# Patient Record
(demographics unavailable — no encounter records)

---

## 2024-12-09 NOTE — HISTORY OF PRESENT ILLNESS
[FreeTextEntry1] : new patient CPE  [de-identified] : 33-year-old female with past medical history asthma, anxiety, GERD, dysmenorrhea, irritable bowel syndrome, right patellofemoral syndrome, transaminitis. Patient has seasonal allergies and allergies to zosyn. Patient does not take any medications on a daily basis.  LMP Nov 13

## 2024-12-09 NOTE — REVIEW OF SYSTEMS
[Fatigue] : fatigue [Vision Problems] : vision problems [Shortness Of Breath] : shortness of breath [Dyspnea on Exertion] : dyspnea on exertion [Abdominal Pain] : abdominal pain [Constipation] : constipation [Heartburn] : heartburn [Dysuria] : dysuria [Headache] : headache [Insomnia] : insomnia [Anxiety] : anxiety [Fever] : no fever [Chills] : no chills [Earache] : no earache [Hearing Loss] : no hearing loss [Hoarseness] : no hoarseness [Nausea] : no nausea [Vomiting] : no vomiting [Hematuria] : no hematuria [Frequency] : no frequency [Dizziness] : no dizziness [Suicidal] : not suicidal [FreeTextEntry2] : increased sweating in hands, feet, and underarms  [FreeTextEntry5] : occasional right sided chest pressure for the past month  [FreeTextEntry9] : bilateral arm pain and numbness at night; right hip pain

## 2024-12-09 NOTE — PHYSICAL EXAM
[No CVA Tenderness] : no CVA  tenderness [Normal] : affect was normal and insight and judgment were intact [de-identified] : right sided lower quadrant abdominal pain  [de-identified] : tenderness in right hip

## 2024-12-09 NOTE — HEALTH RISK ASSESSMENT
[Good] : ~his/her~  mood as  good [No] : No [No falls in past year] : Patient reported no falls in the past year [Patient reported PAP Smear was normal] : Patient reported PAP Smear was normal [HIV Test offered] : HIV Test offered [Hepatitis C test offered] : Hepatitis C test offered [With Family] : lives with family [Unemployed] : unemployed [Less Than High School] : less than high school [] :  [# Of Children ___] : has [unfilled] children [Sexually Active] : sexually active [Feels Safe at Home] : Feels safe at home [Fully functional (bathing, dressing, toileting, transferring, walking, feeding)] : Fully functional (bathing, dressing, toileting, transferring, walking, feeding) [Reports changes in vision] : Reports changes in vision [Never] : Never [FreeTextEntry1] : right hip pain and bilateral arm arthralgias  [de-identified] : house cleaning [de-identified] : so-so; rice and meat, chicken, fish  [High Risk Behavior] : no high risk behavior [PapSmearDate] : 1/24 [FreeTextEntry2] : house cleaning

## 2025-01-09 NOTE — HISTORY OF PRESENT ILLNESS
[FreeTextEntry1] : follow up blood work  [de-identified] : 33-year-old female with  past medical history asthma, anxiety, GERD, dysmenorrhea, irritable bowel syndrome, right patellofemoral syndrome, transaminitis. Patient is here for follow up of blood work. Patient also is endorsing that her her bilateral legs feel heavy overnight, but improves in the morning. Patient also endorses that her fingers feel cold during the day. Patient states she feels tired and feels that she cannot sleep at night. Patient reports she is taking the Vitamin D supplement once a week. Patient states that her burning with urination got better with a week of antibiotics, but then after completing the antibiotics she states she still has burning with urination. Patient denies hematuria.     LMP December 15

## 2025-01-09 NOTE — COUNSELING
[Potential consequences of obesity discussed] : Potential consequences of obesity discussed [Benefits of weight loss discussed] : Benefits of weight loss discussed [Structured Weight Management Program suggested:] : Structured weight management program suggested [Target Wt Loss Goal ___] : Weight Loss Goals: Target weight loss goal [unfilled] lbs [Good understanding] : Patient has a good understanding of disease, goals and obesity follow-up plan [FreeTextEntry2] : Dietary changes, breakfast, snack, lunch, snack, dinner, snack recommendations Food recommendations offered Bread recommendations Increased fruits and vegetables Decrease sugar and chocolate Goal weight loss of 6% in 3 months

## 2025-01-09 NOTE — REVIEW OF SYSTEMS
[Fatigue] : fatigue [Vision Problems] : vision problems [Dysuria] : dysuria [Negative] : Gastrointestinal [Hematuria] : no hematuria [FreeTextEntry6] : feels that she stops breathing for a couple seconds  [FreeTextEntry9] : knee and leg pain bilatearlly; pain in right wrist with cyst; pain in fingers and hands

## 2025-01-09 NOTE — PHYSICAL EXAM
[Normal] : affect was normal and insight and judgment were intact [de-identified] : knee click on right/left; swelling of b/l knees  [de-identified] : 5/5 strength MACKENZIE MO

## 2025-04-10 NOTE — INTERPRETER SERVICES
[Patient Declined  Services] : - None: Patient declined  services [FreeTextEntry3] :  Shared Language Moldovan with MD

## 2025-04-10 NOTE — PHYSICAL EXAM
[Normal] : normal rate, regular rhythm, normal S1 and S2 and no murmur heard [No Edema] : there was no peripheral edema [Soft] : abdomen soft [Non Tender] : non-tender [Non-distended] : non-distended [No Masses] : no abdominal mass palpated [No CVA Tenderness] : no CVA  tenderness [No Spinal Tenderness] : no spinal tenderness [de-identified] : NO back deformities, (-) straight leg test [de-identified] : dermatitis of lower legs

## 2025-04-10 NOTE — PHYSICAL EXAM
[Normal] : normal rate, regular rhythm, normal S1 and S2 and no murmur heard [No Edema] : there was no peripheral edema [Soft] : abdomen soft [Non Tender] : non-tender [Non-distended] : non-distended [No Masses] : no abdominal mass palpated [No CVA Tenderness] : no CVA  tenderness [No Spinal Tenderness] : no spinal tenderness [de-identified] : dermatitis of lower legs [de-identified] : NO back deformities, (-) straight leg test

## 2025-04-10 NOTE — HISTORY OF PRESENT ILLNESS
[de-identified] : 34-year-old female with past medical history asthma, anxiety, GERD, dysmenorrhea, irritable bowel syndrome, right patellofemoral syndrome, transaminitis here for follow up of hand pain and vitamin D deficiency -stabbing pain in her back for many years has not worsened over the years. on and off. worse with activity. NO associated hematuria, incontinence, retention of stool or urine. no injury -had rectal mass in the past - no longer has  -still has hemorrhoids and are sometimes itchy and a little painful, but tolerable -notes itching on lower legs that turned into a rash and is bothering her

## 2025-04-10 NOTE — HISTORY OF PRESENT ILLNESS
[de-identified] : 34-year-old female with past medical history asthma, anxiety, GERD, dysmenorrhea, irritable bowel syndrome, right patellofemoral syndrome, transaminitis here for follow up of hand pain and vitamin D deficiency -stabbing pain in her back for many years has not worsened over the years. on and off. worse with activity. NO associated hematuria, incontinence, retention of stool or urine. no injury -had rectal mass in the past - no longer has  -still has hemorrhoids and are sometimes itchy and a little painful, but tolerable -notes itching on lower legs that turned into a rash and is bothering her

## 2025-04-10 NOTE — PHYSICAL EXAM
[Normal] : normal rate, regular rhythm, normal S1 and S2 and no murmur heard [No Edema] : there was no peripheral edema [Soft] : abdomen soft [Non Tender] : non-tender [Non-distended] : non-distended [No Masses] : no abdominal mass palpated [No CVA Tenderness] : no CVA  tenderness [No Spinal Tenderness] : no spinal tenderness [de-identified] : dermatitis of lower legs [de-identified] : NO back deformities, (-) straight leg test

## 2025-04-10 NOTE — PHYSICAL EXAM
[Normal] : normal rate, regular rhythm, normal S1 and S2 and no murmur heard [No Edema] : there was no peripheral edema [Soft] : abdomen soft [Non Tender] : non-tender [Non-distended] : non-distended [No Masses] : no abdominal mass palpated [No CVA Tenderness] : no CVA  tenderness [No Spinal Tenderness] : no spinal tenderness [de-identified] : NO back deformities, (-) straight leg test [de-identified] : dermatitis of lower legs

## 2025-04-10 NOTE — INTERPRETER SERVICES
[Patient Declined  Services] : - None: Patient declined  services [FreeTextEntry3] :  Shared Language Irish with MD

## 2025-04-10 NOTE — HISTORY OF PRESENT ILLNESS
[de-identified] : 34-year-old female with past medical history asthma, anxiety, GERD, dysmenorrhea, irritable bowel syndrome, right patellofemoral syndrome, transaminitis here for follow up of hand pain and vitamin D deficiency -stabbing pain in her back for many years has not worsened over the years. on and off. worse with activity. NO associated hematuria, incontinence, retention of stool or urine. no injury -had rectal mass in the past - no longer has  -still has hemorrhoids and are sometimes itchy and a little painful, but tolerable -notes itching on lower legs that turned into a rash and is bothering her

## 2025-04-10 NOTE — HISTORY OF PRESENT ILLNESS
[de-identified] : 34-year-old female with past medical history asthma, anxiety, GERD, dysmenorrhea, irritable bowel syndrome, right patellofemoral syndrome, transaminitis here for follow up of hand pain and vitamin D deficiency -stabbing pain in her back for many years has not worsened over the years. on and off. worse with activity. NO associated hematuria, incontinence, retention of stool or urine. no injury -had rectal mass in the past - no longer has  -still has hemorrhoids and are sometimes itchy and a little painful, but tolerable -notes itching on lower legs that turned into a rash and is bothering her

## 2025-04-17 NOTE — HISTORY OF PRESENT ILLNESS
[de-identified] : 34-year-old female with past medical history of asthma, anxiety, GERD, dysmenorrhea, irritable bowel syndrome, right patellofemoral syndrome, transaminitis. Patient continues to have pain in bilateral legs at night. Patient notes a heaviness in her legs. Patient also notes hand pain during the night. Patient does not take anything for the pain. Patient states the pain comes on around midnight every night.   Patient has not been taking her vitamin D because she ran out.   Patient endorses pain in the left side of her lower back. Patient notes that she has been feeling this pain for two to three months. Patient notes that the pain comes and goes. Patient tried the lidocaine patch and does not feel that it helps. Patient has not started physical therapy yet as she has not had time to schedule the appointment.

## 2025-04-17 NOTE — PHYSICAL EXAM
[facility] ambulatory encounter  FAMILY PRACTICE OFFICE VISIT    CHIEF COMPLAINT:    Chief Complaint   Patient presents with   • Follow-up   • Office Visit       SUBJECTIVE:  Kenyetta Cronin is a 78 year old female who presented requesting evaluation for ***    Review of systems:   {ROS:[id number]}     OBJECTIVE:  PROBLEM LIST:   Patient Active Problem List   Diagnosis   • Osteopenia   • Urinary incontinence   • Osteoarthritis   • Left ovarian mass   • Hypercalcemia   • Thickened endometrium       PAST HISTORIES:   {History Links:[id number]::\"I have reviewed the past medical history, family history, social history, medications and allergies listed in the medical record as obtained by my nursing staff and support staff and agree with their documentation.\"}    PHYSICAL EXAM:   {PHYSICAL EXAM:1638994}    LAB RESULTS:   {LAB RESULTS - SUPER LIST:4237152}    ASSESSMENT:   1. Ovarian mass, left    2. Thickened endometrium    3. Osteopenia, unspecified location    4. Urinary incontinence, unspecified type    5. Preoperative clearance    6. Other specified conditions associated with female genital organs and menstrual cycle         PLAN:   Orders Placed This Encounter   • XR Chest PA and Lateral   • CBC with Automated Differential   • Comprehensive Metabolic Panel   • Prothrombin Time   • Urinalysis With Microscopy & Culture If Indicated   • Electrocardiogram 12-Lead       ***    No follow-ups on file.    Instructions provided as documented in the after visit summary.    The {Patient and X:[id number]::\"patient\"} indicated understanding of the diagnosis and agreed with the plan of care.        [No CVA Tenderness] : no CVA  tenderness [No Spinal Tenderness] : no spinal tenderness [Normal] : no joint swelling and grossly normal strength and tone [de-identified] : left sided back pain

## 2025-05-01 NOTE — REVIEW OF SYSTEMS
[Nasal Discharge] : nasal discharge [Sore Throat] : sore throat [Shortness Of Breath] : shortness of breath [Cough] : cough [Fever] : no fever [Chills] : no chills [Pain] : no pain [Vision Problems] : no vision problems [Itching] : no itching [Earache] : no earache [Hearing Loss] : no hearing loss [Postnasal Drip] : no postnasal drip [Chest Pain] : no chest pain [Lower Ext Edema] : no lower extremity edema [Wheezing] : no wheezing [Dyspnea on Exertion] : no dyspnea on exertion [Abdominal Pain] : no abdominal pain [Nausea] : no nausea [Diarrhea] : diarrhea [Vomiting] : no vomiting [Dysuria] : no dysuria [Hematuria] : no hematuria [Frequency] : no frequency [Joint Pain] : no joint pain [Joint Swelling] : no joint swelling [Muscle Weakness] : no muscle weakness [Muscle Pain] : no muscle pain [Skin Rash] : no skin rash

## 2025-05-01 NOTE — HISTORY OF PRESENT ILLNESS
[FreeTextEntry8] : 33 yo female with PMH of season allergies and eczema presenting with cough, congestion, bodyache, sore throat x 3 days. Pt reports of using albuterol x 3 last night due to SOB. Pt reports sob has resolved. Pt reports she uses albuterol when she develops SOB which is induced only during seasonal allergies. Denies smoking. Denies fever, chill, diarrhea.

## 2025-05-01 NOTE — PHYSICAL EXAM
[No Acute Distress] : no acute distress [Normal Sclera/Conjunctiva] : normal sclera/conjunctiva [PERRL] : pupils equal round and reactive to light [EOMI] : extraocular movements intact [Normal Outer Ear/Nose] : the outer ears and nose were normal in appearance [No Lymphadenopathy] : no lymphadenopathy [Supple] : supple [No Respiratory Distress] : no respiratory distress  [No Accessory Muscle Use] : no accessory muscle use [Clear to Auscultation] : lungs were clear to auscultation bilaterally [Normal Rate] : normal rate  [Regular Rhythm] : with a regular rhythm [Normal S1, S2] : normal S1 and S2 [No Murmur] : no murmur heard [Soft] : abdomen soft [Non Tender] : non-tender [Non-distended] : non-distended [No Masses] : no abdominal mass palpated [Normal Bowel Sounds] : normal bowel sounds [Normal Supraclavicular Nodes] : no supraclavicular lymphadenopathy [Normal Posterior Cervical Nodes] : no posterior cervical lymphadenopathy [Normal Anterior Cervical Nodes] : no anterior cervical lymphadenopathy [Alert and Oriented x3] : oriented to person, place, and time [de-identified] : erythematous oropharynx, tonsils erythemtous but no swelling or exudate

## 2025-05-01 NOTE — PHYSICAL EXAM
[No Acute Distress] : no acute distress [Normal Sclera/Conjunctiva] : normal sclera/conjunctiva [PERRL] : pupils equal round and reactive to light [EOMI] : extraocular movements intact [Normal Outer Ear/Nose] : the outer ears and nose were normal in appearance [No Lymphadenopathy] : no lymphadenopathy [Supple] : supple [No Respiratory Distress] : no respiratory distress  [No Accessory Muscle Use] : no accessory muscle use [Clear to Auscultation] : lungs were clear to auscultation bilaterally [Normal Rate] : normal rate  [Regular Rhythm] : with a regular rhythm [Normal S1, S2] : normal S1 and S2 [No Murmur] : no murmur heard [Soft] : abdomen soft [Non Tender] : non-tender [Non-distended] : non-distended [No Masses] : no abdominal mass palpated [Normal Bowel Sounds] : normal bowel sounds [Normal Supraclavicular Nodes] : no supraclavicular lymphadenopathy [Normal Posterior Cervical Nodes] : no posterior cervical lymphadenopathy [Normal Anterior Cervical Nodes] : no anterior cervical lymphadenopathy [Alert and Oriented x3] : oriented to person, place, and time [de-identified] : erythematous oropharynx, tonsils erythemtous but no swelling or exudate

## 2025-05-01 NOTE — PHYSICAL EXAM
[No Acute Distress] : no acute distress [Normal Sclera/Conjunctiva] : normal sclera/conjunctiva [PERRL] : pupils equal round and reactive to light [EOMI] : extraocular movements intact [Normal Outer Ear/Nose] : the outer ears and nose were normal in appearance [No Lymphadenopathy] : no lymphadenopathy [Supple] : supple [No Respiratory Distress] : no respiratory distress  [No Accessory Muscle Use] : no accessory muscle use [Clear to Auscultation] : lungs were clear to auscultation bilaterally [Normal Rate] : normal rate  [Regular Rhythm] : with a regular rhythm [Normal S1, S2] : normal S1 and S2 [No Murmur] : no murmur heard [Soft] : abdomen soft [Non Tender] : non-tender [Non-distended] : non-distended [No Masses] : no abdominal mass palpated [Normal Bowel Sounds] : normal bowel sounds [Normal Supraclavicular Nodes] : no supraclavicular lymphadenopathy [Normal Posterior Cervical Nodes] : no posterior cervical lymphadenopathy [Normal Anterior Cervical Nodes] : no anterior cervical lymphadenopathy [Alert and Oriented x3] : oriented to person, place, and time [de-identified] : erythematous oropharynx, tonsils erythemtous but no swelling or exudate

## 2025-05-01 NOTE — HISTORY OF PRESENT ILLNESS
[FreeTextEntry8] : 35 yo female with PMH of season allergies and eczema presenting with cough, congestion, bodyache, sore throat x 3 days. Pt reports of using albuterol x 3 last night due to SOB. Pt reports sob has resolved. Pt reports she uses albuterol when she develops SOB which is induced only during seasonal allergies. Denies smoking. Denies fever, chill, diarrhea.

## 2025-07-16 NOTE — HISTORY OF PRESENT ILLNESS
[de-identified] : 34-year-old female presenting for follow up of lab results. Patient also endorses that she had a positive home pregnancy test yesterday. Patient notes that her last menstrual period was 6/16. Patient notes that this is a desired pregnancy.

## 2025-07-16 NOTE — HISTORY OF PRESENT ILLNESS
[de-identified] : 34-year-old female presenting for follow up of lab results. Patient also endorses that she had a positive home pregnancy test yesterday. Patient notes that her last menstrual period was 6/16. Patient notes that this is a desired pregnancy.